# Patient Record
Sex: FEMALE | Employment: UNEMPLOYED | URBAN - METROPOLITAN AREA
[De-identification: names, ages, dates, MRNs, and addresses within clinical notes are randomized per-mention and may not be internally consistent; named-entity substitution may affect disease eponyms.]

---

## 2021-06-03 ENCOUNTER — APPOINTMENT (OUTPATIENT)
Dept: INTERPRETER SERVICES | Facility: CLINIC | Age: 31
End: 2021-06-03
Payer: MEDICAID

## 2021-06-04 ENCOUNTER — HOSPITAL ENCOUNTER (OUTPATIENT)
Dept: ULTRASOUND IMAGING | Facility: CLINIC | Age: 31
Discharge: HOME | End: 2021-06-04
Admitting: NURSE PRACTITIONER
Payer: MEDICAID

## 2021-06-04 DIAGNOSIS — Z32.01 POSITIVE PREGNANCY TEST: ICD-10-CM

## 2021-06-04 PROCEDURE — 76801 OB US < 14 WKS SINGLE FETUS: CPT | Mod: 26 | Performed by: RADIOLOGY

## 2021-06-04 PROCEDURE — 76817 TRANSVAGINAL US OBSTETRIC: CPT | Mod: 26 | Performed by: RADIOLOGY

## 2021-06-04 PROCEDURE — 76801 OB US < 14 WKS SINGLE FETUS: CPT

## 2021-06-11 ENCOUNTER — MEDICAL CORRESPONDENCE (OUTPATIENT)
Dept: HEALTH INFORMATION MANAGEMENT | Facility: CLINIC | Age: 31
End: 2021-06-11

## 2021-06-14 ENCOUNTER — TRANSFERRED RECORDS (OUTPATIENT)
Dept: HEALTH INFORMATION MANAGEMENT | Facility: CLINIC | Age: 31
End: 2021-06-14

## 2021-06-17 ENCOUNTER — HOSPITAL ENCOUNTER (OUTPATIENT)
Facility: CLINIC | Age: 31
End: 2021-06-17
Payer: COMMERCIAL

## 2021-07-19 ENCOUNTER — PRE VISIT (OUTPATIENT)
Dept: MATERNAL FETAL MEDICINE | Facility: CLINIC | Age: 31
End: 2021-07-19

## 2021-07-20 ENCOUNTER — OFFICE VISIT (OUTPATIENT)
Dept: MATERNAL FETAL MEDICINE | Facility: CLINIC | Age: 31
End: 2021-07-20
Attending: MIDWIFE
Payer: MEDICAID

## 2021-07-20 ENCOUNTER — HOSPITAL ENCOUNTER (OUTPATIENT)
Dept: ULTRASOUND IMAGING | Facility: CLINIC | Age: 31
End: 2021-07-20
Attending: MIDWIFE
Payer: MEDICAID

## 2021-07-20 ENCOUNTER — APPOINTMENT (OUTPATIENT)
Dept: LAB | Facility: CLINIC | Age: 31
End: 2021-07-20
Attending: MIDWIFE
Payer: MEDICAID

## 2021-07-20 DIAGNOSIS — O26.90 PREGNANCY RELATED CONDITION, ANTEPARTUM: ICD-10-CM

## 2021-07-20 DIAGNOSIS — Z36.82 ENCOUNTER FOR NUCHAL TRANSLUCENCY TESTING: Primary | ICD-10-CM

## 2021-07-20 DIAGNOSIS — Z36.9 UNSPECIFIED ANTENATAL SCREENING: Primary | ICD-10-CM

## 2021-07-20 DIAGNOSIS — Z36.89 ENCOUNTER FOR FETAL ANATOMIC SURVEY: ICD-10-CM

## 2021-07-20 PROCEDURE — 76813 OB US NUCHAL MEAS 1 GEST: CPT | Mod: 26 | Performed by: OBSTETRICS & GYNECOLOGY

## 2021-07-20 PROCEDURE — 96040 HC GENETIC COUNSELING, EACH 30 MINUTES: CPT | Performed by: GENETIC COUNSELOR, MS

## 2021-07-20 PROCEDURE — 76813 OB US NUCHAL MEAS 1 GEST: CPT

## 2021-07-20 NOTE — PROGRESS NOTES
[facility]  Genetic Counseling Consult    Patient: Vicenta Casanova YOB: 1990   Date of Service: 21      Vicenta Casanova was seen at [facility] for genetic consultation to discuss the options for screening and testing for fetal chromosome abnormalities.  The indication for genetic counseling is routine screening for aneuploidy. Today's appointment was facilitated by a Kazakh , Marjorie, from [employer]  services, ID# [id number].       Impression/Plan:   1.  Vicenta had a genetic counseling session and an NT ultrasound today,and opted to decline further aneuploidy screening at this time.  Vicenta understands that screening remains available later in pregnancy if desired.     2.  Maternal serum AFP (single marker screen) is recommended after 15 weeks to screen for open neural tube defects.    3.  An 18-20 week comprehensive ultrasound is available to screen for birth defects and markers of aneuploidy.     Pregnancy History:   /Parity:    Age at Delivery: 31 year old  FLOR: 2022, by Ultrasound  Gestational Age: 12w6d    No significant complications or exposures were reported in the current pregnancy.    Vicenta calzada pregnancy history is significant for 1 prior full term pregnancy with no reported complications.  Her last pregnancy was with a prior partner.    Medical History:   Vicenta calzada reported medical history is not expected to impact pregnancy management or risks to fetal development.       Family History:   A three-generation pedigree was obtained, and is scanned under the  Media  tab.   The reported family history is negative for multiple miscarriages, stillbirths, birth defects, cognitive impairment, known genetic conditions, and consanguinity.       Carrier Screening:       Expanded carrier screening for mutations in a large panel of  genes associated with autosomal recessive conditions including cystic fibrosis, spinal muscular atrophy, and others, is now available.      The patient has had previous carrier screening for common cystic fibrosis mutations and hemoglobin electrophoresis, the results of which were negative.  A copy of the report was available for our review today.       Risk Assessment for Chromosome Conditions:   We explained that the risk for fetal chromosome abnormalities increases with maternal age. We discussed specific features of common chromosome abnormalities, including Down syndrome, trisomy 13, trisomy 18, and sex chromosome trisomies.      - At age 30 at midtrimester, the risk to have a baby with Down syndrome is 1 in 597.    - At age 30 at midtrimester, the risk to have a baby with any chromosome abnormality is 1 in 299.          Testing Options:   We discussed the following options:   Non-invasive Prenatal Testing (NIPT)    Maternal plasma cell-free DNA testing; first trimester ultrasound with nuchal translucency and nasal bone assessment is recommended, when appropriate    Screens for fetal trisomy 21, trisomy 13, trisomy 18, and sex chromosome aneuploidy    Cannot screen for open neural tube defects; maternal serum AFP after 15 weeks is recommended       Genetic Amniocentesis    Invasive procedure typically performed in the second trimester by which amniotic fluid is obtained for the purpose of chromosome analysis and/or other prenatal genetic analysis    Diagnostic results; >99% sensitivity for fetal chromosome abnormalities    AFAFP measurement tests for open neural tube defects       Comprehensive (Level II) ultrasound: Detailed ultrasound performed between 18-22 weeks gestation to screen for major birth defects and markers for aneuploidy.        We reviewed the benefits and limitations of this testing.  Screening tests provide a risk assessment specific to the pregnancy for certain fetal chromosome abnormalities,  but cannot definitively diagnose or exclude a fetal chromosome abnormality.  Follow-up genetic counseling and consideration of diagnostic testing is recommended with any abnormal screening result.     Diagnostic tests carry inherent risks- including risk of miscarriage- that require careful consideration.  These tests can detect fetal chromosome abnormalities with greater than 99% certainty.  Results can be compromised by maternal cell contamination or mosaicism, and are limited by the resolution of cytogenetic G-banding technology.  There is no screening nor diagnostic test that can detect all forms of birth defects or mental disability.     It was a pleasure to be involved with [facility]. Face-to-face time of the meeting was 30 minutes.      Zoran Go MS, [facility]  Licensed Genetic Counselor  Phone: [phone]  Pager: [phone]

## 2021-07-20 NOTE — PROGRESS NOTES
Vicenta Mullins Edilberto was seen for an ultrasound today at the Maternal-Fetal Medicine center.      For the details of the ultrasound please see the report which can be found under the imaging tab.      Shae Diaz MD  , OB/GYN  Maternal-Fetal Medicine  [email or web address]  [phone] (Main MFM Office)  [phone] or [phone] (for 24 hour MFM questions)  [phone] (Pager)

## 2021-08-31 ENCOUNTER — HOSPITAL ENCOUNTER (OUTPATIENT)
Dept: ULTRASOUND IMAGING | Facility: CLINIC | Age: 31
End: 2021-08-31
Attending: OBSTETRICS & GYNECOLOGY
Payer: COMMERCIAL

## 2021-08-31 ENCOUNTER — OFFICE VISIT (OUTPATIENT)
Dept: MATERNAL FETAL MEDICINE | Facility: CLINIC | Age: 31
End: 2021-08-31
Attending: OBSTETRICS & GYNECOLOGY
Payer: COMMERCIAL

## 2021-08-31 DIAGNOSIS — Z36.89 ENCOUNTER FOR FETAL ANATOMIC SURVEY: ICD-10-CM

## 2021-08-31 DIAGNOSIS — O35.9XX0 SUSPECTED FETAL ANOMALY, ANTEPARTUM, SINGLE OR UNSPECIFIED FETUS: Primary | ICD-10-CM

## 2021-08-31 PROCEDURE — 76811 OB US DETAILED SNGL FETUS: CPT

## 2021-08-31 PROCEDURE — 76811 OB US DETAILED SNGL FETUS: CPT | Mod: 26 | Performed by: OBSTETRICS & GYNECOLOGY

## 2021-08-31 NOTE — PROGRESS NOTES
"Please see \"Imaging\" tab under \"Chart Review\" for details of today's visit.    Laina Patel    "

## 2021-09-13 ENCOUNTER — TRANSFERRED RECORDS (OUTPATIENT)
Dept: HEALTH INFORMATION MANAGEMENT | Facility: CLINIC | Age: 31
End: 2021-09-13

## 2021-09-21 ENCOUNTER — HOSPITAL ENCOUNTER (OUTPATIENT)
Dept: ULTRASOUND IMAGING | Facility: CLINIC | Age: 31
End: 2021-09-21
Attending: OBSTETRICS & GYNECOLOGY
Payer: COMMERCIAL

## 2021-09-21 ENCOUNTER — OFFICE VISIT (OUTPATIENT)
Dept: MATERNAL FETAL MEDICINE | Facility: CLINIC | Age: 31
End: 2021-09-21
Attending: OBSTETRICS & GYNECOLOGY
Payer: COMMERCIAL

## 2021-09-21 DIAGNOSIS — O35.9XX0 SUSPECTED FETAL ANOMALY, ANTEPARTUM, SINGLE OR UNSPECIFIED FETUS: Primary | ICD-10-CM

## 2021-09-21 DIAGNOSIS — O35.9XX0 SUSPECTED FETAL ANOMALY, ANTEPARTUM, SINGLE OR UNSPECIFIED FETUS: ICD-10-CM

## 2021-09-21 PROCEDURE — 76816 OB US FOLLOW-UP PER FETUS: CPT

## 2021-09-21 PROCEDURE — 76816 OB US FOLLOW-UP PER FETUS: CPT | Mod: 26 | Performed by: OBSTETRICS & GYNECOLOGY

## 2021-09-22 NOTE — PROGRESS NOTES
Vicenta Mullins Edilberto was seen for an ultrasound today at the Maternal-Fetal Medicine center.      For the details of the ultrasound please see the report which can be found under the imaging tab.      Shae Diaz MD  , OB/GYN  Maternal-Fetal Medicine  [email or web address]  [phone] (Main MFM Office)  [phone] or [phone] (for 24 hour MFM questions)  [phone] (Pager)

## 2022-03-24 ENCOUNTER — OFFICE VISIT (OUTPATIENT)
Dept: FAMILY MEDICINE | Facility: CLINIC | Age: 32
End: 2022-03-24
Payer: COMMERCIAL

## 2022-03-24 VITALS
TEMPERATURE: 98.1 F | HEART RATE: 72 BPM | OXYGEN SATURATION: 100 % | RESPIRATION RATE: 16 BRPM | DIASTOLIC BLOOD PRESSURE: 66 MMHG | SYSTOLIC BLOOD PRESSURE: 101 MMHG | WEIGHT: 124 LBS

## 2022-03-24 DIAGNOSIS — Z30.017 INSERTION OF IMPLANTABLE SUBDERMAL CONTRACEPTIVE: ICD-10-CM

## 2022-03-24 DIAGNOSIS — Z30.017 ENCOUNTER FOR INITIAL PRESCRIPTION OF IMPLANTABLE SUBDERMAL CONTRACEPTIVE: Primary | ICD-10-CM

## 2022-03-24 DIAGNOSIS — Z97.5 NEXPLANON IN PLACE: ICD-10-CM

## 2022-03-24 PROBLEM — Z86.19 HISTORY OF HEPATITIS C: Status: ACTIVE | Noted: 2021-06-14

## 2022-03-24 PROBLEM — Z87.42 HISTORY OF PCOS: Status: ACTIVE | Noted: 2021-06-11

## 2022-03-24 PROBLEM — E55.9 VITAMIN D DEFICIENCY: Status: ACTIVE | Noted: 2021-06-14

## 2022-03-24 LAB — HCG UR QL: NEGATIVE

## 2022-03-24 PROCEDURE — 11981 INSERTION DRUG DLVR IMPLANT: CPT | Performed by: FAMILY MEDICINE

## 2022-03-24 PROCEDURE — 99207 PR DROP WITH A PROCEDURE: CPT | Performed by: FAMILY MEDICINE

## 2022-03-24 PROCEDURE — 81025 URINE PREGNANCY TEST: CPT | Performed by: FAMILY MEDICINE

## 2022-03-24 RX ORDER — FERROUS SULFATE 325(65) MG
TABLET ORAL
COMMUNITY
Start: 2022-01-21

## 2022-03-24 RX ORDER — IBUPROFEN 200 MG
200-600 TABLET ORAL
COMMUNITY
Start: 2022-01-21

## 2022-03-24 NOTE — PROGRESS NOTES
Procedure Note - Etonogestrel Implant Insertion     HPI: Vicenta Casanova is a patient of Dr. Grajeda primary care provider on file. here for Nexplanon/Implanon (etonogestrel implant) insertion.   Indication: unwanted fertility  LMP   N/a: Postpartum  Prev Contraception? None      Counselling and Consent:  Affirmation of informed consent was signed and scanned into the medical record. Risks, benefits and alternatives were discussed. Discussed potential side effects of the etonogestrel implant including the risk of irregular bleeding that may persist across the 3 yrs of use.  Instructed on use of condoms for STI prevention.  Patient's questions were elicited and answered.      Procedure safety checklist was completed:  Yes  Time Out (Pause for the Cause) completed: Yes    Labs: UPT negative    Preoperative Diagnosis:  Unwanted fertility  Postoperative Diagnosis:  same     Technique:   Skin prep Betadine  Anesthesia 2% lidocaine  Suture  No   EBL:   minimal  Complications: No  Tolerance:  Pt tolerated procedure well and was in stable condition.     Pt was positioned on exam table with right arm flexed and externally rotated. Area was marked for insertion 8cm frm the medial epicondyle along the sulcus between the biceps and triceps. Anesthesia provided at the insertion site and along the insertion track and then the area was prepped with betadine. Etonogestrel implant was then inserted subdermally in usual fashion. Provider and patient confirmed placement by palpating the device. Pressure dressing applied and procedure complete.     Follow up:  Pt was instructed to call if bleeding, severe pain or foul smell.  Instructed to remove pressure dressing after 24 hours, then may keep insertion site covered with a bandaid until it is healed.  Instructed that she requires removal or replacement of the device in 5 years.      Follow-up as needed      Resident: Polina Holliday MD  Faculty: Ning Feliz MD  present for and supervised this entire procedure.

## 2022-03-24 NOTE — PATIENT INSTRUCTIONS
Hoja sobre el Nexplanon (implante de progestina), para llevar a casa       El implante comienza a funcionar para prevenir el embarazo en 7 darryl.     Usted debería usar un método de respaldo nikolas los primeros 7 días después de que se lo coloquen.     El implante puede permanecer debajo de da silva piel por 5 años.     Fecha de remoción: 3/24/2027 (en 4 años a partir del día de hoy)    Cosas que debe saber:    Los efectos secundarios comunes incluyen: sangrado Irregular. Shilpi menstruaciones pueden cambiar. Usted podría tener más sangrado, menos sangrado o no tenerlo del todo; y shilpi menstruaciones podrían ser más largas de lo normal.     Moretones e hinchazón en el sitio son normales en las primeras 24 horas. Mantenga los vendajes por 24 horas. Después de 24 horas, usted puede remover los vendajes y carlos john ducha o un baño.     Usted puede chequear el implante haciendo ligera presión con las yemas de los dedos sobre la piel donde el implante fue colocado. Usted debería sentir un pequeño tubo. Si no siente el implante, llame a da silva proveedor de ruby.     Usted puede regresar a la escuela o a da silva trabajo después de da silva betty.    Patient Education     Nexplanon Drug Implant 68 mg  Usos  Para anticoncepción.  Instrucciones  La ksenia en la que se coloca la varilla se cubre con 2 vendajes. Deje puesto el vendaje ancelmo externo nikolas 24 horas. Deje puesto el vendaje pequeño 3 a 5 días, según le haya indicado da silva médico.  Mantenga los vendajes limpios y secos.  Meng medicamento normalmente se coloca bajo la piel del antebrazo, generalmente en el brazo del lado que no se usa para escribir.  Antes de recibir meng medicamento podría ser necesaria john prueba negativa de embarazo.  Podría ser necesario usar un jovanny método anticonceptivo la primera semana después de colocar la varilla. Pregúntele a da silva médico o farmacéutico acerca de la necesidad de un método anticonceptivo de respaldo.  Conserve la tarjeta que incluye la fecha y el  lugar de inserción de la varilla.  La varilla debe retirarse después de 3 años.  Meng medicamento puede causar la aparición de manchas oscuras en el minh. Evite la exposición al sol y use john loción con filtro solar para evitar que estas manchas en la piel se oscurezcan aún más.  Evite exponerse al sol por tiempo prolongado. Use un protector solar con un bao de protección SPF 15 o mayor.  Infórmele a da silva médico y a da silva farmacéutico acerca de todos los medicamentos que usa. Incluya los medicamentos tanto de venta con receta erin sin receta. Infórmele también acerca de toda vitamina, medicamento a base de hierbas, o cualquier otra cosa que use para da silva ruby.  Es muy importante que siga las instrucciones de da silva médico para todos los análisis de boris.  Es muy importante que asista a todas las citas para evaluaciones y pruebas médicas mientras usa meng medicamento.   Precauciones  Algunos pacientes que janine esta medicina noel experimentado graves efectos secundarios. Hable con da silva médico para entender los riesgos y los beneficios relacionados con esta medicina.  Meng medicamento se asocia con un mayor riesgo de coágulos de boris graves. Hable con da silva médico acerca de los riesgos y beneficios de usar meng medicamento.  Meng medicamento se asocia con un mayor riesgo de problemas cardíacos graves, ataque al corazón y derrame cerebral. Hable con da silva médico acerca de los riesgos y beneficios de usar meng medicamento. Comuníquese con da silva médico de inmediato si tiene dolor de pecho o dificultad para respirar.  Informe a da silva médico y a da silva farmacéutico si alguna vez ha tenido john reacción alérgica a un medicamento. Entre los síntomas de john reacción alérgica se incluyen: dificultad para respirar, erupción en la piel, comezón, hinchazón o mareos intensos.  Meng medicamento podría no funcionar igualmente debbie en mujeres que tienen mucho sobrepeso. Hable con da silva médico acerca de cualquier necesidad de bajar de peso.  Meng medicamento  puede afectar da silva capacidad de mantenerse alerta o de reaccionar con rapidez. No maneje ni opere máquinas hasta que sepa qué efecto le provocará meng medicamento.  Consulte a da silva médico antes de beber alcohol mientras usa meng medicamento.  Evite fumar mientras use meng medicamento. Fumar puede aumentar el riesgo de derrame cerebral, ataque al corazón, cóagulos, presión arterial danitza, y otras enfermedades del corazón y los vasos sanguíneos.  Pídale a da silva médico que le explique cómo debe realizarse el autoexamen de los senos. Debe revisarse los senos john vez al mes y avisarle al médico si observa cualquier cambio.  Hable con da silva médico acerca de realizarse un examen físico completo cada año mientras use esta medicamento.  Verifique periódicamente que puede sentir la varilla bajo la piel. Póngase en contacto con da silva médico de inmediato si no puede sentirla o si siente que se ha doblado o roto.  Llame al médico si hay alguna señal de confusión o cambios extraños de conducta.  Informe a da silva médico o farmacéutico si está o planea quedar embarazada, o si está amamantando.  No use meng medicamento si está embarazada. Si queda embarazada mientras usa meng medicamento, llame a da silva médico de inmediato.  Meng medicamento no lo protege a usted ni a da silva gordy contra enfermedades de transmisión sexual.  No tome hierba de [place] mientras use meng medicamento.  Pregúntele a da silva farmacéutico si meng medicamento puede interactuar con cualquiera de sabrina otros medicamentos. No deje de informarle acerca de todos los medicamentos que usted usa.  Infórmele a todos sabrina médicos y dentistas que usted usa meng medicamento antes de que lo atiendan.  No empiece ni deje de carlos otros medicamentos sin hablar melba con el médico o farmacéutico.  Busque atención médica si observa alguna señal de john infección grave. Entre estas señales se incluyen dolor, aumento del enrojecimiento o pus en la ksenia en la que se usa el medicamento.  Efectos  Secundarios  La siguiente es john lista de algunos efectos secundarios comunes de meng medicamento. Hable con el médico para saber qué debe hacer en yosef de tener estos u otros efectos secundarios.    acné    distensión abdominal    dolor en el seno o hinchazón    mareos    pérdida de pelo    jose de laci    danitza presión arterial    manchas color marrón en la gil    náusea    indigestión estomacal o dolor abdominal    sangrado vaginal o manchas entre periodos menstruales    comezón o flujo vaginal    aumento de peso  Llame al médico u obtenga atención médica de inmediato si nota cualquiera de estos efectos secundarios más graves:    aumentar el riesgo de un coágulo de boris    dolor en el pecho    confusión    tos con boris, o vómito con aspecto de poso de café    depresión o sentimiento de tristeza    desmayos    dolor de laci intenso o persistente    latidos del corazón acelerados o irregulares    dolor en la mandíbula    dolor de piernas, hinchazón, calor o enrojecimiento repentinos    cambios de estado de ánimo    falta de aliento    dolor de estómago    síntomas de accidente cerebrovascular (por ejemplo, debilidad en un lado del cuerpo, hablar arrastrando las palabras, confusión)    sudaderas    cansancio o debilidad, extraño o sin causa aparente    orina oscura    calambres en el útero o sangrado de la vagina    visión borrosa o cambios en la visión    dolor, calor, hinchazón o enrojecimiento en el sitio de la incisión    coloración amarilla en los ojos    coloración amarilla en la piel  Algunas personas podrían tener reacciones alérgicas a meng medicamento. Entre los síntomas pueden incluirse: dificultad para respirar, erupción en la piel, comezón, hinchazón o mareos intensos. Si nota algunos de estos síntomas, busque asistencia médica rápidamente.  Extra  Hable con da silva médico, enfermero o farmacéutico si tiene alguna pregunta acerca de meng  medicamento.  [email or web address]  NOTA IMPORTANTE: En meng documento hay sugey explicación breve sobre cómo usar el medicamento, liat no incluye todo lo que hay que saber acerca del medicamento. Da Silva médico o da silva farmacéutico podría suministrarle otros documentos acerca de da silva medicamento. Comuníquese con ellos si tiene alguna pregunta. Siga siempre sabrina consejos. Sugey descripción más completa de meng medicamento está disponible en inglés. Escanee meng código en da silva teléfono inteligente o en da silva tableta, o use la [employer]ión web que aparece a continuación. También puede pedirle a da silva farmacéutico sugey copia impresa. Si tiene alguna pregunta, hágasela a da silva farmacéutico.     2021 [employer]             El implante NO la protege contra las infecciones de transmisión sexual (ETS). Usted debería usar condones de látex y/o protectores dentales para prevenir las ETS. La mayoría de personas se hacen pruebas para las ETS sugey vez al año.     Señales de alarma:  En la primera semana    Enrojecimiento, calor o secreción del sitio de inserción     Fiebre (>101 grados)    En cualquier momento    Sensación de embarazo (dolor en los pechos, náusea)    Prueba casera de embarazo positiva     Sin embargo, los estudios realizados sobre el Implanon NO encontraron fallos.  Previno el embarazo el 100% de las veces!     Si usted tiene cualquiera de las señales mencionadas arriba, debería ser examinada por un profesional de la medicina. Usted puede llamarnos al [phone], o ir a da silva doctor o proveedor de ruby.

## 2022-03-24 NOTE — LETTER
3/24/2022         RE: Vicenta Casanova  [address] [id number]      Procedure Note - Etonogestrel Implant Insertion     HPI: Vicenta Casanova is a patient of Dr. Grajeda primary care provider on file. here for Nexplanon/Implanon (etonogestrel implant) insertion.   Indication: unwanted fertility  LMP   N/a: Postpartum  Prev Contraception? None      Counselling and Consent:  Affirmation of informed consent was signed and scanned into the medical record. Risks, benefits and alternatives were discussed. Discussed potential side effects of the etonogestrel implant including the risk of irregular bleeding that may persist across the 3 yrs of use.  Instructed on use of condoms for STI prevention.  Patient's questions were elicited and answered.      Procedure safety checklist was completed:  Yes  Time Out (Pause for the Cause) completed: Yes    Labs: UPT negative    Preoperative Diagnosis:  Unwanted fertility  Postoperative Diagnosis:  same     Technique:   Skin prep Betadine  Anesthesia 2% lidocaine  Suture  No   EBL:   minimal  Complications: No  Tolerance:  Pt tolerated procedure well and was in stable condition.     Pt was positioned on exam table with right arm flexed and externally rotated. Area was marked for insertion 8cm frm the medial epicondyle along the sulcus between the biceps and triceps. Anesthesia provided at the insertion site and along the insertion track and then the area was prepped with betadine. Etonogestrel implant was then inserted subdermally in usual fashion. Provider and patient confirmed placement by palpating the device. Pressure dressing applied and procedure complete.     Follow up:  Pt was instructed to call if bleeding, severe pain or foul smell.  Instructed to remove pressure dressing after 24 hours, then may keep insertion site covered with a bandaid until it is healed.  Instructed that she requires removal or replacement of the device in 5  years.      Follow-up as needed      Resident: Polina Holliday MD  Faculty: Ning Feliz MD present for and supervised this entire procedure.      Preceptor Attestation:    I discussed the patient with the resident and evaluated the patient in person. I was present for and supervised the entire procedure. I have verified the content of the note, which accurately reflects my assessment of the patient and the plan of care.   Supervising Physician:  Ning Feliz MD.                         Ning Feliz MD

## 2022-03-24 NOTE — PROGRESS NOTES
Preceptor Attestation:    I discussed the patient with the resident and evaluated the patient in person. I was present for and supervised the entire procedure. I have verified the content of the note, which accurately reflects my assessment of the patient and the plan of care.   Supervising Physician:  Ning Feliz MD.